# Patient Record
Sex: FEMALE | Race: WHITE | Employment: FULL TIME | URBAN - METROPOLITAN AREA
[De-identification: names, ages, dates, MRNs, and addresses within clinical notes are randomized per-mention and may not be internally consistent; named-entity substitution may affect disease eponyms.]

---

## 2019-06-26 ENCOUNTER — NURSE TRIAGE (OUTPATIENT)
Dept: NURSING | Facility: CLINIC | Age: 29
End: 2019-06-26

## 2019-06-26 ENCOUNTER — HOSPITAL ENCOUNTER (EMERGENCY)
Facility: CLINIC | Age: 29
Discharge: HOME | End: 2019-06-26
Attending: NURSE PRACTITIONER | Admitting: NURSE PRACTITIONER
Payer: COMMERCIAL

## 2019-06-26 VITALS
BODY MASS INDEX: 26.52 KG/M2 | DIASTOLIC BLOOD PRESSURE: 80 MMHG | SYSTOLIC BLOOD PRESSURE: 139 MMHG | HEART RATE: 79 BPM | HEIGHT: 66 IN | OXYGEN SATURATION: 99 % | TEMPERATURE: 98 F | WEIGHT: 165 LBS

## 2019-06-26 DIAGNOSIS — N89.8 VAGINAL DISCHARGE: ICD-10-CM

## 2019-06-26 LAB
SPECIMEN SOURCE: NORMAL
WET PREP SPEC: NORMAL

## 2019-06-26 PROCEDURE — 99214 OFFICE O/P EST MOD 30 MIN: CPT | Mod: Z6 | Performed by: NURSE PRACTITIONER

## 2019-06-26 PROCEDURE — 87491 CHLMYD TRACH DNA AMP PROBE: CPT | Performed by: NURSE PRACTITIONER

## 2019-06-26 PROCEDURE — G0463 HOSPITAL OUTPT CLINIC VISIT: HCPCS | Performed by: NURSE PRACTITIONER

## 2019-06-26 PROCEDURE — 87591 N.GONORRHOEAE DNA AMP PROB: CPT | Performed by: NURSE PRACTITIONER

## 2019-06-26 PROCEDURE — 87210 SMEAR WET MOUNT SALINE/INK: CPT | Performed by: NURSE PRACTITIONER

## 2019-06-26 RX ORDER — MOMETASONE FUROATE MONOHYDRATE 50 UG/1
2 SPRAY, METERED NASAL DAILY
COMMUNITY
End: 2020-06-10

## 2019-06-26 ASSESSMENT — ENCOUNTER SYMPTOMS
FATIGUE: 0
WEAKNESS: 0
CHILLS: 0
HEADACHES: 0
FLANK PAIN: 0
SHORTNESS OF BREATH: 0
NUMBNESS: 0
LIGHT-HEADEDNESS: 0
FEVER: 0
DYSURIA: 0
HEMATURIA: 0
FREQUENCY: 0
DIFFICULTY URINATING: 0
COUGH: 0
DIZZINESS: 0

## 2019-06-26 ASSESSMENT — MIFFLIN-ST. JEOR: SCORE: 1490.19

## 2019-06-26 NOTE — TELEPHONE ENCOUNTER
"\"I have been having a very strong odor in vaginal area.\" Patient reporting symptoms started 2 months ago. Patient was seen at [facility] and diagnosed with an yeast infection. Reporting ongoing foul odor. Denies discharge or itchy. Patient reporting \"only foul odor.\"  Per guidelines advised to be seen with in 2 weeks. Caller verbalized understanding. Denies further questions.    Mirna Sethi RN  [facility]      Reason for Disposition    All other vaginal symptoms  (Exception: feels like prior yeast infection, minor abrasion, mild rash < 24 hour duration, mild itching)    Additional Information    Negative: Sounds like a life-threatening emergency to the triager    Negative: Followed a genital area injury    Negative: Foreign body in vagina (e.g., tampon)    Negative: Vaginal bleeding is main symptom    Negative: Vaginal discharge is main symptom    Negative: Pain or burning with urination is main symptom    Negative: Menstrual cramps is main symptom    Negative: Abdomen pain is main symptom    Negative: Pubic lice suspected    Negative: Itching or rash of external female genital area (vulva)    Negative: Patient sounds very sick or weak to the triager    Negative: [1] SEVERE pain AND [2] not improved 2 hours after pain medicine    Negative: [1] Genital area looks infected (e.g., draining sore, spreading redness) AND [2] fever    Negative: [1] Something is hanging out of the vagina AND [2] can't easily be pushed back inside    Negative: MODERATE-SEVERE itching (i.e., interferes with school, work, or sleep)    Negative: Genital area looks infected (e.g., draining sore, spreading redness)    Negative: Rash with painful tiny water blisters    Negative: [1] Rash (e.g., redness, tiny bumps, sore) of genital area AND [2] present > 24 hours    Negative: Tender lump (swelling or \"ball\") at vaginal opening    Negative: [1] Symptoms of a yeast infection (i.e., itchy, white discharge, not bad smelling) " AND    [2] not improved > 3 days following CARE ADVICE    Negative: [1] Vaginal itching AND [2] not improved > 3 days following CARE ADVICE    Negative: Patient is worried about sexually transmitted disease (STD)    Negative: Feels like something inside is falling out of vagina (e.g., pressure, heaviness, fullness)    Negative: [1] Vaginal dryness or itching AND [2] nearing menopause or after menopause    Negative: Pain with sexual intercourse (dyspareunia)  (Exception: feels like prior yeast infection, minor abrasion, minor rash < 24 hour duration, mild itching)    Negative: Pain in genital area is a chronic symptom (recurrent or ongoing AND present > 4 weeks)    Protocols used: VAGINAL SYMPTOMS-A-AH

## 2019-06-26 NOTE — ED AVS SNAPSHOT
[facility] Emergency Department  [address] [address]  Phone:  [phone]  Fax:  [phone]                                    Deean Guan   MRN: [id number]    Department:  [facility] Emergency Department   Date of Visit:  6/26/2019           After Visit Summary Signature Page    I have received my discharge instructions, and my questions have been answered. I have discussed any challenges I see with this plan with the nurse or doctor.    ..........................................................................................................................................  Patient/Patient Representative Signature      ..........................................................................................................................................  Patient Representative Print Name and Relationship to Patient    ..................................................               ................................................  Date                                   Time    ..........................................................................................................................................  Reviewed by Signature/Title    ...................................................              ..............................................  Date                                               Time          22EPIC Rev 08/18

## 2019-06-26 NOTE — ED PROVIDER NOTES
"  History     Chief Complaint   Patient presents with     Vaginal Discharge     HPI  Deena Guan is a 28 year old female who presents to the urgent care due to vaginal discharge.  Patient noticed the malodorous vaginal discharge 2 months ago and was treated and tested for BV and yeast.  Patient does not recall with these medications were.  The symptoms have continued since then.  Patient denies any fevers, dysuria, dyspareunia, pelvic or abdominal pain, hematuria or any other accompanying symptoms. Patient in monogamous relationship for 3 years. Not currently using barrier protection.      Allergies:  No Known Allergies    Problem List:    There are no active problems to display for this patient.       Past Medical History:    History reviewed. No pertinent past medical history.    Past Surgical History:    No past surgical history on file.    Family History:    No family history on file.    Social History:  Marital Status:    Social History     Tobacco Use     Smoking status: None   Substance Use Topics     Alcohol use: None     Drug use: None        Medications:      mometasone (NASONEX) 50 MCG/ACT nasal spray         Review of Systems   Constitutional: Negative for chills, fatigue and fever.   Respiratory: Negative for cough and shortness of breath.    Cardiovascular: Negative for chest pain.   Genitourinary: Positive for vaginal discharge. Negative for difficulty urinating, dyspareunia, dysuria, flank pain, frequency, genital sores, hematuria, pelvic pain, urgency, vaginal bleeding and vaginal pain.   Skin: Negative for rash.   Neurological: Negative for dizziness, weakness, light-headedness, numbness and headaches.       Physical Exam   BP: 139/80  Pulse: 79  Temp: 98  F (36.7  C)  Height: 167.6 cm (5' 6\")  Weight: 74.8 kg (165 lb)  SpO2: 99 %      Physical Exam   Constitutional: She is oriented to person, place, and time. She appears well-developed and well-nourished. No distress.   Cardiovascular: Normal " rate and regular rhythm.   Pulmonary/Chest: Effort normal and breath sounds normal. No respiratory distress.   Abdominal: Soft. She exhibits no distension. There is no tenderness.   Genitourinary: Rectum normal. There is no tenderness or lesion on the right labia. There is no tenderness or lesion on the left labia. Cervix exhibits discharge. Cervix exhibits no motion tenderness and no friability. No erythema, tenderness or bleeding in the vagina. Vaginal discharge found.   Musculoskeletal: Normal range of motion.   Neurological: She is alert and oriented to person, place, and time.   Skin: Skin is warm. Capillary refill takes less than 2 seconds. She is not diaphoretic.       ED Course        Procedures             Results for orders placed or performed during the hospital encounter of 06/26/19 (from the past 24 hour(s))   Wet prep   Result Value Ref Range    Specimen Description Vagina     Wet Prep Moderate  WBC'S seen       Wet Prep No Trichomonas seen     Wet Prep No clue cells seen     Wet Prep No yeast seen        Medications - No data to display    Assessments & Plan (with Medical Decision Making)   Patient is a 28 year old female who presents to the urgent care with complaints of malodorous vaginal discharge. Patient has moderate amount of pink tinted malodorous discharge present. Patient does note she has had intermittent spotting, instructed to follow up with OBGYN regarding this and her implanted birth control. Wet prep negative other than moderate WBCs seen. Pending gonorrhea and chlamydia. Plan to abstain from sex while waiting for results. Discussed with patient about possible allergic vaginitis possible if new exposures present. Patient is agreeable to plan of care, all questions answered. Patient should return with any new or worsening symptoms. Patient in no distress upon discharge.   I have reviewed the nursing notes.    I have reviewed the findings, diagnosis, plan and need for follow up with the  patient.       Medication List      There are no discharge medications for this visit.         Final diagnoses:   Vaginal discharge       6/26/2019   [facility] EMERGENCY DEPARTMENT     Katrin Ortega APRN CNP  06/26/19 2002

## 2019-06-27 LAB
C TRACH DNA SPEC QL NAA+PROBE: NEGATIVE
N GONORRHOEA DNA SPEC QL NAA+PROBE: NEGATIVE
SPECIMEN SOURCE: NORMAL
SPECIMEN SOURCE: NORMAL

## 2019-06-27 NOTE — RESULT ENCOUNTER NOTE
Final result for both N. Gonorrhoeae PCR and Chlamydia Trachomatis PCR are NEGATIVE.  No treatment or change in treatment per [facility] ED Lab Result protocol.

## 2019-09-30 ENCOUNTER — OFFICE VISIT (OUTPATIENT)
Dept: FAMILY MEDICINE | Facility: CLINIC | Age: 29
End: 2019-09-30
Payer: COMMERCIAL

## 2019-09-30 VITALS
WEIGHT: 167 LBS | DIASTOLIC BLOOD PRESSURE: 72 MMHG | BODY MASS INDEX: 26.84 KG/M2 | TEMPERATURE: 98.3 F | SYSTOLIC BLOOD PRESSURE: 122 MMHG | RESPIRATION RATE: 18 BRPM | HEART RATE: 72 BPM | HEIGHT: 66 IN

## 2019-09-30 DIAGNOSIS — N89.8 VAGINAL ODOR: ICD-10-CM

## 2019-09-30 DIAGNOSIS — L30.9 ECZEMA, UNSPECIFIED TYPE: ICD-10-CM

## 2019-09-30 DIAGNOSIS — B96.89 BV (BACTERIAL VAGINOSIS): Primary | ICD-10-CM

## 2019-09-30 DIAGNOSIS — Z12.4 SCREENING FOR CERVICAL CANCER: ICD-10-CM

## 2019-09-30 DIAGNOSIS — N76.0 BV (BACTERIAL VAGINOSIS): Primary | ICD-10-CM

## 2019-09-30 DIAGNOSIS — B37.31 YEAST INFECTION OF THE VAGINA: ICD-10-CM

## 2019-09-30 LAB
SPECIMEN SOURCE: ABNORMAL
WET PREP SPEC: ABNORMAL

## 2019-09-30 PROCEDURE — 99214 OFFICE O/P EST MOD 30 MIN: CPT | Performed by: NURSE PRACTITIONER

## 2019-09-30 PROCEDURE — G0145 SCR C/V CYTO,THINLAYER,RESCR: HCPCS | Performed by: NURSE PRACTITIONER

## 2019-09-30 PROCEDURE — 87210 SMEAR WET MOUNT SALINE/INK: CPT | Performed by: NURSE PRACTITIONER

## 2019-09-30 PROCEDURE — 87591 N.GONORRHOEAE DNA AMP PROB: CPT | Performed by: NURSE PRACTITIONER

## 2019-09-30 PROCEDURE — 87491 CHLMYD TRACH DNA AMP PROBE: CPT | Performed by: NURSE PRACTITIONER

## 2019-09-30 RX ORDER — TRIAMCINOLONE ACETONIDE 1 MG/G
OINTMENT TOPICAL 2 TIMES DAILY
Qty: 30 G | Refills: 1 | Status: SHIPPED | OUTPATIENT
Start: 2019-09-30

## 2019-09-30 RX ORDER — FLUCONAZOLE 150 MG/1
150 TABLET ORAL ONCE
Qty: 1 TABLET | Refills: 0 | Status: SHIPPED | OUTPATIENT
Start: 2019-09-30 | End: 2019-09-30

## 2019-09-30 RX ORDER — METRONIDAZOLE 500 MG/1
500 TABLET ORAL 2 TIMES DAILY
Qty: 14 TABLET | Refills: 0 | Status: SHIPPED | OUTPATIENT
Start: 2019-09-30 | End: 2019-10-07

## 2019-09-30 ASSESSMENT — MIFFLIN-ST. JEOR: SCORE: 1503.23

## 2019-09-30 NOTE — PROGRESS NOTES
"Subjective     Deena Guan is a 28 year old female who presents to clinic today for the following health issues:    HPI     Vaginal Symptoms      Duration: a year    Description  odor    Intensity:  severe    Accompanying signs and symptoms (fever/dysuria/abdominal or back pain): None    History  Sexually active: yes, single partner, contraception - IUD  Possibility of pregnancy: No  Recent antibiotic use: no     Precipitating or alleviating factors: None    Therapies tried and outcome: none        Chief Complaint   Patient presents with     Establish Care     Lesion     Itchy bumps on both elbows  For several months.  Getting worse.  Tried neosporin - made it burn.       Breast Problem     Discoloration of breasts  For several months.  redish-purplish streaks in the lower half of the breast.  Has gain weight lately.       Vaginal Problem     Vaginal odor- has an IUD- was seen in June for this and got a bunch of tests done               Reviewed and updated as needed this visit by Provider  Tobacco  Allergies  Meds  Problems  Med Hx  Surg Hx  Fam Hx         Review of Systems   ROS COMP: Constitutional, HEENT, cardiovascular, pulmonary, gi and gu systems are negative, except as otherwise noted.      Objective    /72   Pulse 72   Temp 98.3  F (36.8  C) (Tympanic)   Resp 18   Ht 1.683 m (5' 6.25\")   Wt 75.8 kg (167 lb)   BMI 26.75 kg/m    Body mass index is 26.75 kg/m .  Physical Exam   GENERAL: healthy, alert and no distress  RESP: lungs clear to auscultation - no rales, rhonchi or wheezes  BREAST: normal without masses, tenderness or nipple discharge and no palpable axillary masses or adenopathy. Stretch marks on bilateral breasts.  CV: regular rate and rhythm, normal S1 S2, no S3 or S4, no murmur, click or rub, no peripheral edema and peripheral pulses strong  ABDOMEN: soft, nontender, no hepatosplenomegaly, no masses and bowel sounds normal   (female): normal female external genitalia, " "normal urethral meatus , vaginal mucosa pink, moist, well rugated, vaginal discharge - moderate and white and normal cervix, adnexae, and uterus without masses.  SKIN: erythematous patches and papules on both elbows, right > left.    Diagnostic Test Results:  Results for orders placed or performed in visit on 09/30/19 (from the past 24 hour(s))   Wet prep   Result Value Ref Range    Specimen Description Vagina     Wet Prep No Trichomonas seen     Wet Prep Many  Yeast seen   (A)     Wet Prep Many  Clue cells seen   (A)     Wet Prep Few  WBC'S seen              Assessment & Plan       ICD-10-CM    1. BV (bacterial vaginosis) N76.0 metroNIDAZOLE (FLAGYL) 500 MG tablet    B96.89    2. Yeast infection of the vagina B37.3 fluconazole (DIFLUCAN) 150 MG tablet   3. Eczema, unspecified type L30.9 triamcinolone (KENALOG) 0.1 % external ointment   4. Vaginal odor N89.8 Wet prep     NEISSERIA GONORRHOEA PCR     CHLAMYDIA TRACHOMATIS PCR   5. Screening for cervical cancer Z12.4 Pap imaged thin layer screen reflex to HPV if ASCUS - recommend age 25 - 29        BMI:   Estimated body mass index is 26.75 kg/m  as calculated from the following:    Height as of this encounter: 1.683 m (5' 6.25\").    Weight as of this encounter: 75.8 kg (167 lb).   Weight management plan: Discussed healthy diet and exercise guidelines        Return in about 1 week (around 10/7/2019), or if symptoms worsen or fail to improve.      The risks, benefits and treatment options of prescribed medications or other treatments have been discussed with the patient. The patient verbalized their understanding and should call or follow up if no improvement or if they develop further problems.    FAMILIA Griffin [facility]      "

## 2019-09-30 NOTE — PATIENT INSTRUCTIONS
Thank you for choosing [facility].  You may be receiving an email and/or telephone survey request from [facility] Customer Experience regarding your visit today.  Please take a few minutes to respond to the survey to let us know how we are doing.      If you have questions or concerns, please contact us via [employer] or you can contact your care team at [phone].    Our Clinic hours are:  Monday 6:40 am  to 7:00 pm  Tuesday -Friday 6:40 am to 5:00 pm    The [place] outpatient lab hours are:  Monday - Friday 6:10 am to 4:45 pm  Saturdays 7:00 am to 11:00 am  Appointments are required, call [phone]    If you have clinical questions after hours or would like to schedule an appointment,  call the clinic at [phone].

## 2019-09-30 NOTE — NURSING NOTE
"Initial /72   Pulse 72   Temp 98.3  F (36.8  C) (Tympanic)   Resp 18   Ht 1.683 m (5' 6.25\")   Wt 75.8 kg (167 lb)   BMI 26.75 kg/m   Estimated body mass index is 26.75 kg/m  as calculated from the following:    Height as of this encounter: 1.683 m (5' 6.25\").    Weight as of this encounter: 75.8 kg (167 lb). .      Leonor Azar CMA    "

## 2019-10-02 LAB
COPATH REPORT: NORMAL
PAP: NORMAL

## 2019-10-17 ENCOUNTER — OFFICE VISIT (OUTPATIENT)
Dept: AUDIOLOGY | Facility: CLINIC | Age: 29
End: 2019-10-17
Payer: COMMERCIAL

## 2019-10-17 DIAGNOSIS — H93.13 TINNITUS OF BOTH EARS: Primary | ICD-10-CM

## 2019-10-17 NOTE — PROGRESS NOTES
AUDIOLOGY REPORT    SUBJECTIVE:  Deena Guan is a 28 year old female who was seen in the Audiology Clinic at the [facility] for audiologic evaluation, referred by [facility] [facility]. The patient reports a decrease in hearing bilaterally. She reports frequently turning up the TV and asking people to repeat themselves. She reports intermittent tinnitus and occasional imbalance. 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 The patient denies pain, drainage, previous ear surgery.  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     OBJECTIVE:  Abuse Screening:  Do you feel unsafe at home or work/school? No  Do you feel threatened by someone? No  Does anyone try to keep you from having contact with others, or doing things outside of your home? No  Physical signs of abuse present? No     Fall Risk Screen:  1. Have you fallen two or more times in the past year? No  2. Have you fallen and had an injury in the past year? No    Otoscopic exam indicates ears are clear of cerumen bilaterally     Pure Tone Thresholds assessed using conventional audiometry with good  reliability from 250-8000 Hz bilaterally using insert earphones and circumaural headphones     RIGHT:  normal hearing sensitivity    LEFT:    normal hearing sensitivity    Tympanogram:    RIGHT: normal eardrum mobility    LEFT:   normal eardrum mobility    Reflexes (reported by stimulus ear):  RIGHT: Ipsilateral is present at normal levels  RIGHT: Contralateral is present at normal levels  LEFT:   Ipsilateral is present at normal levels  LEFT:   Contralateral is present at normal levels    Speech Reception Threshold:    RIGHT: 10 dB HL    LEFT:   5 dB HL  Word Recognition Score:     RIGHT: 96% at 55 dB HL using NU-6 recorded word list.    LEFT:   96% at 55 dB HL using NU-6 recorded word  list.      ASSESSMENT:   Normal hearing sensitivity bilaterally. Today s results were discussed with the patient in detail.     PLAN:  Patient was counseled regarding good communication stratagies. It is recommended that the patient return for repeat testing in a few years, or if concerns arise.  Please call this clinic with questions regarding these results or recommendations.    Alana Lerman, B.S.   Audiology Doctoral Student    I was present with the patient for the entire Audiology appointment including all procedures/testing performed by the AuD student, and agree with the student s assessment and plan as documented.      Nicholas Leiva., [facility]  Licensed Audiologist  [place] #[id number]

## 2019-12-18 ENCOUNTER — NURSE TRIAGE (OUTPATIENT)
Dept: NURSING | Facility: CLINIC | Age: 29
End: 2019-12-18

## 2019-12-18 NOTE — TELEPHONE ENCOUNTER
"\"I have had a very sharp pain directly under my rib cage.\" Pain with deep breath, exercising, or any movement. With rest and sitting pain completely resolves. Denies injury. Afebrile. Symptoms starting at 4 a.m. today. Patient is currently at work.   Per guidelines reviewed home care. Patient was advised for any pain that was severe for 1 hour or longer to go directly to the Emergency Room. Advised for any change or increase in symptoms to call back.    Patient prefers to set up appointment at this time for later today. She agrees to go into the ER if symptoms worsen prior to appointment.    Mirna Sethi RN  [facility]        Additional Information    Negative: Severe difficulty breathing (e.g., struggling for each breath, speaks in single words)    Negative: Shock suspected (e.g., cold/pale/clammy skin, too weak to stand, low BP, rapid pulse)    Negative: Difficult to awaken or acting confused (e.g., disoriented, slurred speech)    Negative: Passed out (i.e., lost consciousness, collapsed and was not responding)    Negative: Visible sweat on face or sweat dripping down face    Negative: Sounds like a life-threatening emergency to the triager    Negative: Followed an abdomen (stomach) injury    Negative: Chest pain    Negative: [1] SEVERE pain (e.g., excruciating) AND [2] present > 1 hour    Negative: [1] Pain lasts > 10 minutes AND [2] age > 50    Negative: [1] Pain lasts > 10 minutes AND [2] age > 40 AND [3] associated chest, arm, neck, upper back or jaw pain    Negative: [1] Pain lasts > 10 minutes AND [2] age > 35 AND [3] at least one cardiac risk factor (i.e., hypertension, diabetes, obesity, smoker or strong family history of heart disease)    Negative: [1] Pain lasts > 10 minutes AND [2] history of heart disease (i.e., heart attack, bypass surgery, angina, angioplasty, CHF; not just a heart murmur)    Negative: [1] Pain lasts > 10 minutes AND [2] difficulty breathing    Negative: [1] Vomiting " "AND [2] contains red blood  (Exception: few streaks and only occurred once)    Negative: [1] Vomiting AND [2] contains black (\"coffee ground\") material    Negative: Blood in bowel movements  (Exception: Blood on surface of BM with constipation)    Negative: Black or tarry bowel movements  (Exception: chronic-unchanged  black-grey bowel movements AND is taking iron pills or Pepto-bismol)    Negative: [1] Pregnant > 24 weeks AND [2] hand or face swelling    Negative: Patient sounds very sick or weak to the triager    Negative: [1] MILD-MODERATE pain AND [2] constant AND [3] present > 2 hours    Negative: [1] MILD-MODERATE pain AND [2] not relieved by antacids    Negative: [1] Vomiting AND [2] contains bile (green color)    Negative: [1] Vomiting AND [2] abdomen looks much more swollen than usual    Negative: White of the eyes have turned yellow (i.e., jaundice)    Negative: Fever > 103 F (39.4 C)    Negative: [1] Fever > 101 F (38.3 C) AND [2] age > 60    Negative: [1] Fever > 100.0 F (37.8 C) AND [2] bedridden (e.g., nursing home patient, CVA, chronic illness, recovering from surgery)    Negative: [1] Fever > 100.0 F (37.8 C) AND [2] diabetes mellitus or weak immune system (e.g., HIV positive, cancer chemo, splenectomy, chronic steroids)    Negative: [1] MODERATE pain (e.g., interferes with normal activities) AND [2] comes and goes (cramps) AND [3] present > 24 hours  (Exception: pain with Vomiting or Diarrhea - see that Guideline)    Negative: [1] MILD pain (e.g., does not interfere with normal activities) AND [2] comes and goes (cramps) AND [3] present > 72 hours    Negative: Alcohol abuse known or suspected    Negative: [1] Abdominal pain is intermittent AND [2] shoots into chest, with sour taste in mouth    Negative: Abdominal pain is a chronic symptom (recurrent or ongoing AND present > 4 weeks)    Negative: Abdominal pains regularly occur about 1 hour after meals    Abdominal pain    Protocols used: ABDOMINAL " PAIN - UPPER-A-AH

## 2020-03-11 ENCOUNTER — HEALTH MAINTENANCE LETTER (OUTPATIENT)
Age: 30
End: 2020-03-11

## 2020-06-10 ENCOUNTER — OFFICE VISIT (OUTPATIENT)
Dept: FAMILY MEDICINE | Facility: CLINIC | Age: 30
End: 2020-06-10
Payer: COMMERCIAL

## 2020-06-10 VITALS
TEMPERATURE: 97.4 F | WEIGHT: 146.2 LBS | RESPIRATION RATE: 16 BRPM | HEART RATE: 63 BPM | HEIGHT: 67 IN | OXYGEN SATURATION: 99 % | DIASTOLIC BLOOD PRESSURE: 76 MMHG | SYSTOLIC BLOOD PRESSURE: 118 MMHG | BODY MASS INDEX: 22.95 KG/M2

## 2020-06-10 DIAGNOSIS — J30.2 SEASONAL ALLERGIC RHINITIS, UNSPECIFIED TRIGGER: ICD-10-CM

## 2020-06-10 DIAGNOSIS — L82.1 SEBORRHEIC KERATOSIS: ICD-10-CM

## 2020-06-10 DIAGNOSIS — E73.9 LACTOSE INTOLERANCE: Primary | ICD-10-CM

## 2020-06-10 PROCEDURE — 99214 OFFICE O/P EST MOD 30 MIN: CPT | Performed by: NURSE PRACTITIONER

## 2020-06-10 RX ORDER — MOMETASONE FUROATE MONOHYDRATE 50 UG/1
2 SPRAY, METERED NASAL DAILY
Qty: 17 G | Refills: 11 | Status: SHIPPED | OUTPATIENT
Start: 2020-06-10

## 2020-06-10 ASSESSMENT — MIFFLIN-ST. JEOR: SCORE: 1411.82

## 2020-06-10 NOTE — PROGRESS NOTES
"Subjective     Deena Guan is a 29 year old female who presents to clinic today for the following health issues:    Chief Complaint   Patient presents with     Derm Problem     Mole, right breast, has change in size and color. Noticed changes in mole a week ago.       Diarrhea     Patient thinks she may be lactose intolerance.       HPI   Diarrhea  Triggered only by dairy products.  Onset: 2-3 months    Description:   Consistency of stool: watery  Blood in stool: no   Number of loose stools in past 24 hours: 5 - happens immediately after eating dairy and resolves within 15-30 minutes.    Progression of Symptoms:  Intermittent based on what she is eating and drinking    Accompanying Signs & Symptoms:  Fever: no   Nausea or vomiting; YES- nausea last night  Abdominal pain: no   Episodes of constipation: YES  Weight loss: no     History:   Ill contacts: no   Recent use of antibiotics: no    Recent travels: no          Recent medication-new or changes(Rx or OTC): no     Precipitating factors:   lactose    Alleviating factors:   Stay away from dairy    Therapies Tried and outcome:  Has not tried anything      Refill Nasonex:  Using for seasonal allergies  Works well  No side effects.        Reviewed and updated as needed this visit by Provider  Tobacco  Allergies  Meds  Problems  Med Hx  Surg Hx  Fam Hx         Review of Systems   Constitutional, HEENT, cardiovascular, pulmonary, gi and gu systems are negative, except as otherwise noted.      Objective    /76 (BP Location: Right arm, Patient Position: Chair, Cuff Size: Adult Regular)   Pulse 63   Temp 97.4  F (36.3  C) (Tympanic)   Resp 16   Ht 1.695 m (5' 6.75\")   Wt 66.3 kg (146 lb 3.2 oz)   SpO2 99%   BMI 23.07 kg/m    Body mass index is 23.07 kg/m .  Physical Exam   GENERAL: healthy, alert and no distress  NECK: no adenopathy, no asymmetry, masses, or scars and thyroid normal to palpation  RESP: lungs clear to auscultation - no rales, rhonchi " or wheezes  CV: regular rate and rhythm, normal S1 S2, no S3 or S4, no murmur, click or rub, no peripheral edema and peripheral pulses strong  ABDOMEN: soft, nontender, no hepatosplenomegaly, no masses and bowel sounds normal  MS: no gross musculoskeletal defects noted, no edema  SKIN: upper right breast - 3 mm raised flat brown lesion with rough surface and stuck-on appearance.            Assessment & Plan       ICD-10-CM    1. Lactose intolerance  E73.9 Discussed avoiding lactose.  Handout given   May trial Lactaid tablets prior to dairy if desired.     2. Seborrheic keratosis  L82.1 Discussed benign nature of lesion.  Discussed monitoring other moles for changes.     3. Seasonal allergic rhinitis, unspecified trigger  J30.2 mometasone (NASONEX) 50 MCG/ACT nasal spray - refilled.       Patient Instructions       Thank you for choosing [facility].  You may be receiving an email and/or telephone survey request from [facility] regarding your visit today.  Please take a few minutes to respond to the survey to let us know how we are doing.      If you have questions or concerns, please contact us via [employer] or you can contact your care team at [phone].    Our Clinic hours are:  Monday 6:40 am  to 7:00 pm  Tuesday -Friday 6:40 am to 5:00 pm    The [place] outpatient lab hours are:  Monday - Friday 6:10 am to 4:45 pm  Saturdays 7:00 am to 11:00 am  Appointments are required, call [phone]    If you have clinical questions after hours or would like to schedule an appointment,  call the clinic at [phone].    Patient Education     Tips for Lactose Intolerance    If you are lactose intolerant, you have trouble digesting lactose. Lactose is a sugar found in milk and other dairy products. Many people are lactose intolerant. Undigested lactose won t hurt you, but it can cause unpleasant symptoms. The good news is that you can get relief. To help reduce symptoms, look for ways to limit  the amount of lactose you eat. You can also take a lactase supplement before you eat dairy products.  Finding your limit  People with lactose intolerance may think they can t eat or drink any dairy products. This is often not true. Many people with lactose intolerance can eat or drink small amounts of dairy products without symptoms. To find your own limit, keep track of what you eat and drink. Write down when you have symptoms. Learn how much and what kinds of dairy products you can handle.  Tips to reduce symptoms    Choose low-lactose or lactose-free dairy products. These are widely available. They include products like milk, yogurt, cheese, and ice cream, among others.    Eat foods with active cultures, such as yogurt. Active cultures make lactose easier to digest.    Eat or drink dairy products with other foods to lessen symptoms.    Use fruit juice to replace some or all of the milk in recipes.    Take lactase enzyme tablets with dairy products to help prevent symptoms.    Avoid eating many high-lactose foods (such as milk, butter, and ice cream) at one time.  Eat other calcium-rich foods  If you eat less dairy, you may be getting less calcium. Ask your doctor about calcium supplements. Also, eat more dairy-free, calcium-rich foods, such as:    Broccoli, lettuce greens, kale, bok bel (Chinese cabbage), turnip greens    Fish with edible bones (canned salmon or sardines)    Alfalfa sprouts, soy sprouts    Tofu, soybeans, schmidt beans, navy beans    Almonds, sesame seeds    Calcium-fortified orange juice, soy drink, rice drink    Oranges  Be aware that the calcium from these foods varies. It may not be as well absorbed by the body as calcium from dairy products.   Try nondairy substitutes  Dairy Substitute   Milk, cream Soy drink, rice drink, nondairy creamer   Cheese Tofu (soy) cheese, some aged cheeses   Butter, margarine Milk-free margarine, vegetable oil   Ice cream Fruit sorbet, juice bars      Your body  needs vitamin D to use calcium. You can get vitamin D by eating foods that have vitamin D. These include salmon, tuna, and eggs. Also, talk with your provider about taking a vitamin D supplement. Your vitamin D levels can be checked and followed by a blood test to be sure you are not lacking this nutrient.  Removing all dairy items from your diet is not often needed. And removing dairy also means taking out other healthy foods from your diet. This is why lactose-free dairy products are often a good choice. Your provider can also talk with you about taking calcium and vitamin D supplements.  Date Last Reviewed: 7/1/2016 2000-2019 [employer]. [address] [place] [address], [place] [address]. All rights reserved. This information is not intended as a substitute for professional medical care. Always follow your healthcare professional's instructions.               Return in about 6 months (around 12/10/2020) for Physical Exam.    The risks, benefits and treatment options of prescribed medications or other treatments have been discussed with the patient. The patient verbalized their understanding and should call or follow up if no improvement or if they develop further problems.    FAMILIA Griffin [facility]

## 2020-06-10 NOTE — PATIENT INSTRUCTIONS
Thank you for choosing [facility].  You may be receiving an email and/or telephone survey request from [facility] Customer Experience regarding your visit today.  Please take a few minutes to respond to the survey to let us know how we are doing.      If you have questions or concerns, please contact us via [employer] or you can contact your care team at [phone].    Our Clinic hours are:  Monday 6:40 am  to 7:00 pm  Tuesday -Friday 6:40 am to 5:00 pm    The [place] outpatient lab hours are:  Monday - Friday 6:10 am to 4:45 pm  Saturdays 7:00 am to 11:00 am  Appointments are required, call [phone]    If you have clinical questions after hours or would like to schedule an appointment,  call the clinic at [phone].    Patient Education     Tips for Lactose Intolerance    If you are lactose intolerant, you have trouble digesting lactose. Lactose is a sugar found in milk and other dairy products. Many people are lactose intolerant. Undigested lactose won t hurt you, but it can cause unpleasant symptoms. The good news is that you can get relief. To help reduce symptoms, look for ways to limit the amount of lactose you eat. You can also take a lactase supplement before you eat dairy products.  Finding your limit  People with lactose intolerance may think they can t eat or drink any dairy products. This is often not true. Many people with lactose intolerance can eat or drink small amounts of dairy products without symptoms. To find your own limit, keep track of what you eat and drink. Write down when you have symptoms. Learn how much and what kinds of dairy products you can handle.  Tips to reduce symptoms    Choose low-lactose or lactose-free dairy products. These are widely available. They include products like milk, yogurt, cheese, and ice cream, among others.    Eat foods with active cultures, such as yogurt. Active cultures make lactose easier to digest.    Eat or drink dairy products with other  foods to lessen symptoms.    Use fruit juice to replace some or all of the milk in recipes.    Take lactase enzyme tablets with dairy products to help prevent symptoms.    Avoid eating many high-lactose foods (such as milk, butter, and ice cream) at one time.  Eat other calcium-rich foods  If you eat less dairy, you may be getting less calcium. Ask your doctor about calcium supplements. Also, eat more dairy-free, calcium-rich foods, such as:    Broccoli, lettuce greens, kale, bok bel (Chinese cabbage), turnip greens    Fish with edible bones (canned salmon or sardines)    Alfalfa sprouts, soy sprouts    Tofu, soybeans, schmidt beans, navy beans    Almonds, sesame seeds    Calcium-fortified orange juice, soy drink, rice drink    Oranges  Be aware that the calcium from these foods varies. It may not be as well absorbed by the body as calcium from dairy products.   Try nondairy substitutes  Dairy Substitute   Milk, cream Soy drink, rice drink, nondairy creamer   Cheese Tofu (soy) cheese, some aged cheeses   Butter, margarine Milk-free margarine, vegetable oil   Ice cream Fruit sorbet, juice bars      Your body needs vitamin D to use calcium. You can get vitamin D by eating foods that have vitamin D. These include salmon, tuna, and eggs. Also, talk with your provider about taking a vitamin D supplement. Your vitamin D levels can be checked and followed by a blood test to be sure you are not lacking this nutrient.  Removing all dairy items from your diet is not often needed. And removing dairy also means taking out other healthy foods from your diet. This is why lactose-free dairy products are often a good choice. Your provider can also talk with you about taking calcium and vitamin D supplements.  Date Last Reviewed: 7/1/2016 2000-2019 [employer]. [address] [place] [address], [place] [address]. All rights reserved. This information is not intended as a substitute for professional medical care. Always follow  your healthcare professional's instructions.

## 2020-06-11 ENCOUNTER — TELEPHONE (OUTPATIENT)
Dept: FAMILY MEDICINE | Facility: CLINIC | Age: 30
End: 2020-06-11

## 2020-06-12 NOTE — TELEPHONE ENCOUNTER
Prior Authorization Retail Medication Request    Medication/Dose: mometasone nasal spray  ICD code (if different than what is on RX):    Previously Tried and Failed:  Has used previously with success  Rationale:      Insurance Name:  [phone]  Insurance ID:  [id number]      Pharmacy Information (if different than what is on RX)  Name:    Phone:

## 2020-06-12 NOTE — TELEPHONE ENCOUNTER
Central Prior Authorization Team   Phone: [phone]    [place] Initiation    Medication: mometasone nasal spray  Insurance Company: [employer] - Phone [phone] Fax [phone]  Pharmacy Filling the Rx: [id number] - [address] AT [address]  Filling Pharmacy Phone: [phone]  Filling Pharmacy Fax: [phone]  Start Date: 6/12/2020

## 2020-06-12 NOTE — TELEPHONE ENCOUNTER
Prior Authorization Approval    Authorization Effective Date: 5/13/2020  Authorization Expiration Date: 6/12/2021  Medication: mometasone nasal spray-APPROVED  Approved Dose/Quantity:    Reference #:     Insurance Company: [employer] - Phone [phone] Fax [phone]  Expected CoPay:       CoPay Card Available:      Foundation Assistance Needed:    Which Pharmacy is filling the prescription (Not needed for infusion/clinic administered): [id number] - [address] AT [address]  Pharmacy Notified: Yes  Patient Notified: Yes  **Instructed pharmacy to notify patient when script is ready to /ship.**

## 2021-01-03 ENCOUNTER — HEALTH MAINTENANCE LETTER (OUTPATIENT)
Age: 31
End: 2021-01-03

## 2021-04-25 ENCOUNTER — HEALTH MAINTENANCE LETTER (OUTPATIENT)
Age: 31
End: 2021-04-25

## 2021-10-10 ENCOUNTER — HEALTH MAINTENANCE LETTER (OUTPATIENT)
Age: 31
End: 2021-10-10

## 2022-05-21 ENCOUNTER — HEALTH MAINTENANCE LETTER (OUTPATIENT)
Age: 32
End: 2022-05-21

## 2022-09-18 ENCOUNTER — HEALTH MAINTENANCE LETTER (OUTPATIENT)
Age: 32
End: 2022-09-18

## 2023-06-04 ENCOUNTER — HEALTH MAINTENANCE LETTER (OUTPATIENT)
Age: 33
End: 2023-06-04